# Patient Record
Sex: FEMALE | Race: AMERICAN INDIAN OR ALASKA NATIVE
[De-identification: names, ages, dates, MRNs, and addresses within clinical notes are randomized per-mention and may not be internally consistent; named-entity substitution may affect disease eponyms.]

---

## 2022-07-11 ENCOUNTER — HOSPITAL ENCOUNTER (EMERGENCY)
Dept: HOSPITAL 5 - ED | Age: 62
LOS: 1 days | Discharge: HOME | End: 2022-07-12
Payer: MEDICARE

## 2022-07-11 DIAGNOSIS — I50.9: ICD-10-CM

## 2022-07-11 DIAGNOSIS — I11.0: ICD-10-CM

## 2022-07-11 DIAGNOSIS — Z79.899: ICD-10-CM

## 2022-07-11 DIAGNOSIS — R77.8: Primary | ICD-10-CM

## 2022-07-11 LAB
ALBUMIN SERPL-MCNC: 4.1 G/DL (ref 3.9–5)
ALT SERPL-CCNC: 24 UNITS/L (ref 7–56)
BUN SERPL-MCNC: 6 MG/DL (ref 7–17)
BUN/CREAT SERPL: 10 %
CALCIUM SERPL-MCNC: 9.2 MG/DL (ref 8.4–10.2)
HCT VFR BLD CALC: 34.2 % (ref 30.3–42.9)
HDLC SERPL-MCNC: 97 MG/DL (ref 40–59)
HEMOLYSIS INDEX: 0
HGB BLD-MCNC: 10.4 GM/DL (ref 10.1–14.3)
MCHC RBC AUTO-ENTMCNC: 30 % (ref 30–34)
MCV RBC AUTO: 82 FL (ref 79–97)
PLATELET # BLD: 438 K/MM3 (ref 140–440)
RBC # BLD AUTO: 4.16 M/MM3 (ref 3.65–5.03)

## 2022-07-11 PROCEDURE — 80061 LIPID PANEL: CPT

## 2022-07-11 PROCEDURE — 93005 ELECTROCARDIOGRAM TRACING: CPT

## 2022-07-11 PROCEDURE — 71046 X-RAY EXAM CHEST 2 VIEWS: CPT

## 2022-07-11 PROCEDURE — 80053 COMPREHEN METABOLIC PANEL: CPT

## 2022-07-11 PROCEDURE — 84484 ASSAY OF TROPONIN QUANT: CPT

## 2022-07-11 PROCEDURE — 99284 EMERGENCY DEPT VISIT MOD MDM: CPT

## 2022-07-11 PROCEDURE — 83880 ASSAY OF NATRIURETIC PEPTIDE: CPT

## 2022-07-11 PROCEDURE — 85027 COMPLETE CBC AUTOMATED: CPT

## 2022-07-11 PROCEDURE — 36415 COLL VENOUS BLD VENIPUNCTURE: CPT

## 2022-07-11 NOTE — EMERGENCY DEPARTMENT REPORT
Stated Complaint: SHORT OF BREATH





- HPI


History of Present Illness: 





few day history of worsening sob and BLE swelling. 





- ROS


Review of Systems: 





sob and BLE swelling.  denies cp, n/v.  





- Exam


Physical Exam: 





BLE swelling. alert and oriented. 


MSE screening note: 


Focused history and physical exam performed.


Due to findings the following was ordered: cbc, cmp, bnp, ekg, cxr, troponin. 


patient to be seen by provider in ed when she gets a bed in the back. 











ED Disposition for MSE


Condition: Stable

## 2022-07-11 NOTE — XRAY REPORT
XR chest routine 2V



INDICATION / CLINICAL INFORMATION: sob, ble swelling.



COMPARISON: None available.



FINDINGS:



SUPPORT DEVICES: None.

HEART /PULMONARY VASCULATURE: No significant abnormality. 

LUNGS / PLEURA: No significant pulmonary or pleural abnormality. No pneumothorax. 



ADDITIONAL FINDINGS: No significant additional findings.



IMPRESSION:

1. No acute findings.



Signer Name: Nish Rashid MD 

Signed: 7/11/2022 5:47 PM

Workstation Name: [employer]

## 2022-07-12 VITALS — SYSTOLIC BLOOD PRESSURE: 122 MMHG | DIASTOLIC BLOOD PRESSURE: 87 MMHG

## 2022-07-12 NOTE — EMERGENCY DEPARTMENT REPORT
ED Shortness of Breath HPI





- General


Chief Complaint: Dyspnea/Respdistress


Stated Complaint: SHORT OF BREATH


Time Seen by Provider: 07/12/22 16:17


Source: patient


Mode of arrival: Ambulatory


Limitations: No Limitations





- History of Present Illness


Initial Comments: 





62-year-old black female with a past medical history of GERD, chronic back pain 

and newly diagnosed CHF presents to the emergency department for evaluation of 

several day history of worsening shortness of breath and bilateral lower 

extremity pain.  She states that she was admitted to the hospital about a month 

ago and diagnosed with congestive heart failure and sent home on medications but

she states that she feels like more more water over the past several days.  She 

denies chest pain, nausea, vomiting, dizziness, and diaphoresis.  She states 

that she was given Bumex in the house at home, Bumex, but after review of her 

medication list, she did not have Bumex on the list.  Her caregiver said that 

she was taking Flomax as a diuretic.


MD Complaint: shortness of breath


-: Gradual, days(s) (7-8)


Pain Scale: 0


Worsens With: lying flat, exertion


Known History Of: congestive heart failure


Associated Symptoms: cough, lower abdominal swelling


Treatments Prior to Arrival: none





- Related Data


Home Oxygen Therapy: No


                                  Previous Rx's











 Medication  Instructions  Recorded  Last Taken  Type


 


Furosemide [Lasix] 40 mg PO BID #30 tab 07/12/22 Unknown Rx


 


Potassium Chloride 20 meq PO DAILY #15 tab 07/12/22 Unknown Rx











                                    Allergies











Allergy/AdvReac Type Severity Reaction Status Date / Time


 


codeine Allergy  Itching Verified 07/12/22 05:23


 


Penicillins Allergy  Shortness Verified 07/12/22 05:23





   of Breath  














ED Review of Systems


ROS: 


Stated complaint: SHORT OF BREATH


Other details as noted in HPI





Comment: All other systems reviewed and negative


Constitutional: denies: chills, fever


Eyes: denies: eye pain, eye discharge


ENT: denies: congestion


Respiratory: cough, orthopnea, shortness of breath, SOB with exertion, SOB at 

rest.  denies: stridor, wheezing


Cardiovascular: dyspnea on exertion, orthopnea, edema.  denies: chest pain, 

palpitations, syncope, paroxysmal nocturnal dyspnea


Gastrointestinal: denies: abdominal pain, nausea, vomiting


Genitourinary: denies: urgency, dysuria


Musculoskeletal: denies: back pain


Skin: denies: rash, lesions


Neurological: denies: headache, weakness





ED Past Medical Hx





- Social History


Smoking Status: Unknown if ever smoked


Substance Use Type: None





- Medications


Home Medications: 


                                Home Medications











 Medication  Instructions  Recorded  Confirmed  Last Taken  Type


 


Furosemide [Lasix] 40 mg PO BID #30 tab 07/12/22  Unknown Rx


 


Potassium Chloride 20 meq PO DAILY #15 tab 07/12/22  Unknown Rx














ED Physical Exam





- General


Limitations: No Limitations


General appearance: alert, in no apparent distress





- Head


Head exam: Present: atraumatic, normocephalic





- Eye


Eye exam: Present: normal appearance.  Absent: scleral icterus, conjunctival 

injection, periorbital swelling, periorbital tenderness





- ENT


ENT exam: Present: normal exam, normal orophraynx





- Neck


Neck exam: Present: normal inspection.  Absent: tenderness, full ROM, 

lymphadenopathy





- Respiratory


Respiratory exam: Present: rales (Bilateral bases).  Absent: respiratory distre

ss, wheezes, rhonchi, stridor, chest wall tenderness





- Cardiovascular


Cardiovascular Exam: Present: regular rate, normal heart sounds





- GI/Abdominal


GI/Abdominal exam: Present: soft, normal bowel sounds.  Absent: distended, 

tenderness, guarding, rebound, rigid





- Expanded Lower Extremity Exam


  ** Left


Ankle exam: Present: full ROM, swelling.  Absent: tenderness


Foot/Toe exam: Present: full ROM, swelling.  Absent: tenderness, deformity, 

crepidus, dislocation, erythema


Neuro vascular tendon exam: Present: no vascular compromise.  Absent: pulse 

deficit, abnormal cap refill, extremity cold to touch, pallor


Gait: Positive: observed and normal





  ** Right


Ankle exam: Present: full ROM, swelling.  Absent: tenderness, abrasion, 

ecchymosis, deformity, crepidus, dislocation, erythema


Foot/Toe exam: Present: full ROM, swelling.  Absent: tenderness, abrasion, 

ecchymosis, deformity, crepidus, erythema


Neuro vascular tendon exam: Present: no vascular compromise.  Absent: pulse 

deficit, abnormal cap refill, extremity cold to touch, pallor





- Back Exam


Back exam: Present: normal inspection.  Absent: CVA tenderness (R), CVA 

tenderness (L), vertebral tenderness





- Neurological Exam


Neurological exam: Present: alert, oriented X3, CN II-XII intact, normal gait, 

reflexes normal.  Absent: motor sensory deficit





- Psychiatric


Psychiatric exam: Present: normal affect, normal mood





- Skin


Skin exam: Present: warm, dry, intact, normal color





ED Course


                                   Vital Signs











  07/11/22 07/12/22 07/12/22





  16:52 06:26 15:26


 


Temperature 99.2 F 98 F 


 


Pulse Rate 84 70 67


 


Respiratory 20 15 18





Rate   


 


Blood Pressure  140/90 


 


Blood Pressure 157/88 140/90 143/80





[Right]   


 


O2 Sat by Pulse 100 95 100





Oximetry   














  07/12/22





  17:09


 


Temperature 


 


Pulse Rate 78


 


Respiratory 18





Rate 


 


Blood Pressure 


 


Blood Pressure 122/87





[Right] 


 


O2 Sat by Pulse 99





Oximetry 














- Reevaluation(s)


Reevaluation #1: 





07/12/22 16:50


Patient states that shortness of breath has improved after dose of Lasix and 

several trips to the restroom.  Patient states that she feels like the swelling 

in her ankles have also improved.  Walking pulse ox noted to be 100%.  Case was 

discussed with Dr. Irizarry who states that patient is okay to be discharged home 

on Lasix and follow-up with her cardiologist.





ED Medical Decision Making





- Lab Data


Result diagrams: 


                                 07/11/22 20:03





                                 07/11/22 20:03





- EKG Data


Interpretation: no acute changes





- Radiology Data


Radiology results: report reviewed, image reviewed





Chest x-ray:


 FINDINGS:  


 


 SUPPORT DEVICES: None.  


 HEART /PULMONARY VASCULATURE: No significant abnormality.   


 LUNGS / PLEURA: No significant pulmonary or pleural abnormality. No 

pneumothorax.   


 


 ADDITIONAL FINDINGS: No significant additional findings.  


 


 IMPRESSION:  


 1. No acute findings.  





- Medical Decision Making





62-year-old black female with a past medical history of GERD, chronic back pain 

and newly diagnosed CHF presents to the emergency department for evaluation of 

several day history of worsening shortness of breath and bilateral lower 

extremity pain.  She states that she was admitted to the hospital about a month 

ago and diagnosed with congestive heart failure and sent home on medications but

 she states that she feels like more more water over the past several days.  She

 denies chest pain, nausea, vomiting, dizziness, and diaphoresis.  She states 

that she was given Bumex in the house at home, Bumex, but after review of her 

medication list, she did not have Bumex on the list.  Her caregiver said that 

she was taking Flomax as a diuretic





Patient noted to have bilateral lower lobe Rales and bilateral lower extremity 

edema on exam.  Chest x-ray without any acute abnormalities noted.  Patient was 

treated with 80 mg dose of Lasix p.o. with which she had several trips to the 

restroom that were unmeasured by nursing staff, but patient states that she 

feels like she has urinated a lot.  Bilateral lower extremity swelling improved 

from initial exam in triage.  Patient able to ambulate around the room and back 

and forth to the restroom without any shortness of breath.  Patient noted to 

have elevated BNP and troponin.  Troponin noted to have flat trend and likely 

secondary to acute CHF exacerbation.  EKG without any acute ischemic 

abnormalities noted.  After further investigation and speaking with patient and 

caregiver.  She has not been taking oral diuretics at home, and because she 

responded so well to p.o. Lasix, she will be discharged home with prescription 

for Lasix 40 mg twice daily along with potassium 20 mg p.o. daily to take as 

directed and advised to follow-up with her cardiologist for further evaluation 

and management.  Patient was advised to start taking daily weights and if she 

noticed that her weight starts to trend up, follow-up with her cardiologist 

immediately.  She was also advised all of the lifestyle changes given written 

information about the same.  Discussed plan of care and importance of follow-up 

so that she can be considered for GDMT for optimization of CHF by cardiologist, 

and patient and caregiver verbalized understanding of and agreement with plan of

 care.  Vital signs stable no acute distress noted, and case was discussed with 

Dr. Irizarry, attending emergency room physician, who states that patient is okay 

to be discharged home with Lasix and follow-up with cardiology.


Critical care attestation.: 


If time is entered above; I have spent that time in minutes in the direct care 

of this critically ill patient, excluding procedure time.








ED Disposition


Clinical Impression: 


 Elevated troponin





CHF exacerbation


Qualifiers:


 Heart failure type: unspecified Qualified Code(s): I50.9 - Heart failure, 

unspecified





Disposition: 01 HOME / SELF CARE / HOMELESS


Is pt being admited?: No


Does the pt Need Aspirin: No


Condition: Stable


Instructions:  Heart Failure, Self Care, Easy-to-Read, Living With Heart 

Failure, Heart Failure Exacerbation, Heart Failure Medicines, Heart Failure 

Eating Plan


Additional Instructions: 


Take medication as prescribed.  Continue your home medications.  Follow-up with 

cardiology as planned.  Return to the emergency department as needed.


Prescriptions: 


Furosemide [Lasix] 40 mg PO BID #30 tab


Potassium Chloride 20 meq PO DAILY #15 tab


Referrals: 


JULITO VILLANUEVA MD [Primary Care Provider] - 3-5 Days


MADDISON PATEL MD [Staff Physician] - 3-5 Days


Time of Disposition: 16:54

## 2022-07-13 NOTE — ELECTROCARDIOGRAPH REPORT
[facility]

                                       

Test Date:    2022               Test Time:    17:01:47

Pat Name:     SPENCER GARCIA            Department:   

Patient ID:   [id number]          Room:          

Gender:       F                        Technician:   PRETTY

:          1960               Requested By: DANIEL NOVAK

Order Number: [id number]              Reading MD:   Jasper Lao

                                 Measurements

Intervals                              Axis          

Rate:         85                       P:            67

ND:           147                      QRS:          4

QRSD:         78                       T:            64

QT:           404                                    

QTc:          480                                    

                           Interpretive Statements

Sinus rhythm

Probable left atrial enlargement

Probable left ventricular hypertrophy

non-specific st-t anterior leads

No previous ECG available for comparison

Electronically Signed On 2022 8:52:14 EDT by Jasper Lao

## 2022-09-01 ENCOUNTER — OFFICE VISIT (OUTPATIENT)
Dept: URBAN - METROPOLITAN AREA CLINIC 25 | Facility: CLINIC | Age: 62
End: 2022-09-01

## 2022-09-29 ENCOUNTER — WEB ENCOUNTER (OUTPATIENT)
Dept: URBAN - METROPOLITAN AREA CLINIC 25 | Facility: CLINIC | Age: 62
End: 2022-09-29

## 2022-09-29 ENCOUNTER — OFFICE VISIT (OUTPATIENT)
Dept: URBAN - METROPOLITAN AREA CLINIC 25 | Facility: CLINIC | Age: 62
End: 2022-09-29
Payer: COMMERCIAL

## 2022-09-29 ENCOUNTER — TELEPHONE ENCOUNTER (OUTPATIENT)
Dept: URBAN - METROPOLITAN AREA CLINIC 6 | Facility: CLINIC | Age: 62
End: 2022-09-29

## 2022-09-29 VITALS
SYSTOLIC BLOOD PRESSURE: 118 MMHG | HEIGHT: 61 IN | WEIGHT: 111.2 LBS | BODY MASS INDEX: 20.99 KG/M2 | DIASTOLIC BLOOD PRESSURE: 75 MMHG | HEART RATE: 68 BPM | TEMPERATURE: 97.5 F

## 2022-09-29 DIAGNOSIS — Z90.49 ACQUIRED ABSENCE OF OTHER SPECIFIED PARTS OF DIGESTIVE TRACT: ICD-10-CM

## 2022-09-29 DIAGNOSIS — R19.7 DIARRHEA, UNSPECIFIED: ICD-10-CM

## 2022-09-29 DIAGNOSIS — R10.13 EPIGASTRIC ABDOMINAL PAIN: ICD-10-CM

## 2022-09-29 DIAGNOSIS — R93.2 BILIARY TRACT IMAGING ABNORMALITY: ICD-10-CM

## 2022-09-29 PROCEDURE — 99204 OFFICE O/P NEW MOD 45 MIN: CPT | Performed by: INTERNAL MEDICINE

## 2022-09-29 RX ORDER — CARVEDILOL 3.12 MG/1
1 TABLET WITH FOOD TABLET, FILM COATED ORAL TWICE A DAY
Status: ACTIVE | COMMUNITY

## 2022-09-29 RX ORDER — SPIRONOLACTONE 25 MG/1
1 TABLET TABLET, FILM COATED ORAL
Status: ACTIVE | COMMUNITY

## 2022-09-29 RX ORDER — DICYCLOMINE HYDROCHLORIDE 20 MG/1
1 TABLET TABLET ORAL THREE TIMES A DAY
Qty: 90 TABLET | Refills: 1 | OUTPATIENT
Start: 2022-09-29 | End: 2022-11-27

## 2022-09-29 RX ORDER — LISINOPRIL 5 MG/1
1 TABLET TABLET ORAL ONCE A DAY
Status: ACTIVE | COMMUNITY

## 2022-09-29 RX ORDER — GABAPENTIN 400 MG/1
1 CAPSULE CAPSULE ORAL ONCE A DAY
Status: ACTIVE | COMMUNITY

## 2022-09-29 RX ORDER — CHOLESTYRAMINE 4 G/9G
1 PACKET MIXED WITH WATER OR NON-CARBONATED DRINK POWDER, FOR SUSPENSION ORAL ONCE A DAY
Qty: 30 | OUTPATIENT
Start: 2022-09-29

## 2022-09-29 RX ORDER — TIZANIDINE 4 MG/1
1 TABLET AS NEEDED TABLET ORAL THREE TIMES A DAY
Status: ACTIVE | COMMUNITY

## 2022-09-29 NOTE — HPI-TODAY'S VISIT:
63 yo pt presents with 6 month hx of intermittent epigastric pain which she self treated until recent visit with Dr. Banda who ordered labs and ultrasound which possible bile duct obstruction. She denies wt loss or f/c/n/v. Admits to associated non bloody diarrhea that occurs daily while awake.

## 2022-10-19 ENCOUNTER — TELEPHONE ENCOUNTER (OUTPATIENT)
Dept: URBAN - METROPOLITAN AREA CLINIC 25 | Facility: CLINIC | Age: 62
End: 2022-10-19

## 2022-10-27 ENCOUNTER — OFFICE VISIT (OUTPATIENT)
Dept: URBAN - METROPOLITAN AREA CLINIC 25 | Facility: CLINIC | Age: 62
End: 2022-10-27

## 2022-11-17 ENCOUNTER — OFFICE VISIT (OUTPATIENT)
Dept: URBAN - METROPOLITAN AREA CLINIC 25 | Facility: CLINIC | Age: 62
End: 2022-11-17
Payer: COMMERCIAL

## 2022-11-17 ENCOUNTER — DASHBOARD ENCOUNTERS (OUTPATIENT)
Age: 62
End: 2022-11-17

## 2022-11-17 VITALS
DIASTOLIC BLOOD PRESSURE: 81 MMHG | HEART RATE: 65 BPM | SYSTOLIC BLOOD PRESSURE: 160 MMHG | HEIGHT: 61 IN | BODY MASS INDEX: 23.41 KG/M2 | WEIGHT: 124 LBS | TEMPERATURE: 97.5 F

## 2022-11-17 DIAGNOSIS — Z90.49 ACQUIRED ABSENCE OF OTHER SPECIFIED PARTS OF DIGESTIVE TRACT: ICD-10-CM

## 2022-11-17 DIAGNOSIS — R93.2 BILIARY TRACT IMAGING ABNORMALITY: ICD-10-CM

## 2022-11-17 DIAGNOSIS — R10.13 EPIGASTRIC ABDOMINAL PAIN: ICD-10-CM

## 2022-11-17 DIAGNOSIS — R19.7 DIARRHEA, UNSPECIFIED: ICD-10-CM

## 2022-11-17 PROCEDURE — 99213 OFFICE O/P EST LOW 20 MIN: CPT | Performed by: INTERNAL MEDICINE

## 2022-11-17 RX ORDER — GABAPENTIN 400 MG/1
1 CAPSULE CAPSULE ORAL ONCE A DAY
Status: ACTIVE | COMMUNITY

## 2022-11-17 RX ORDER — LISINOPRIL 5 MG/1
1 TABLET TABLET ORAL ONCE A DAY
Status: ACTIVE | COMMUNITY

## 2022-11-17 RX ORDER — SPIRONOLACTONE 25 MG/1
1 TABLET TABLET, FILM COATED ORAL
Status: ACTIVE | COMMUNITY

## 2022-11-17 RX ORDER — TIZANIDINE 4 MG/1
1 TABLET AS NEEDED TABLET ORAL THREE TIMES A DAY
Status: ACTIVE | COMMUNITY

## 2022-11-17 RX ORDER — CHOLESTYRAMINE 4 G/9G
1 PACKET MIXED WITH WATER OR NON-CARBONATED DRINK POWDER, FOR SUSPENSION ORAL ONCE A DAY
Qty: 30 | Status: ACTIVE | COMMUNITY
Start: 2022-09-29

## 2022-11-17 RX ORDER — DICYCLOMINE HYDROCHLORIDE 20 MG/1
1 TABLET TABLET ORAL THREE TIMES A DAY
Qty: 90 TABLET | Refills: 1 | Status: ACTIVE | COMMUNITY
Start: 2022-09-29 | End: 2022-11-27

## 2022-11-17 RX ORDER — CARVEDILOL 3.12 MG/1
1 TABLET WITH FOOD TABLET, FILM COATED ORAL TWICE A DAY
Status: ACTIVE | COMMUNITY

## 2022-11-17 NOTE — HPI-TODAY'S VISIT:
61 yo pt with abd discomfort presents for f/u s/p mrcp to follow dilated cbd on ultrasound. Results revealed dilated cbd c/w post cholecystectomy.

## 2022-12-01 PROBLEM — 53156005: Status: ACTIVE | Noted: 2022-09-29

## 2022-12-01 PROBLEM — 84410009: Status: ACTIVE | Noted: 2022-09-29
